# Patient Record
Sex: MALE | URBAN - METROPOLITAN AREA
[De-identification: names, ages, dates, MRNs, and addresses within clinical notes are randomized per-mention and may not be internally consistent; named-entity substitution may affect disease eponyms.]

---

## 2018-09-26 ENCOUNTER — OFFICE VISIT (OUTPATIENT)
Dept: URBAN - METROPOLITAN AREA CLINIC 23 | Facility: CLINIC | Age: 83
End: 2018-09-26
Payer: COMMERCIAL

## 2018-09-26 DIAGNOSIS — H53.2 DIPLOPIA: ICD-10-CM

## 2018-09-26 DIAGNOSIS — E11.9 TYPE 2 DIABETES MELLITUS W/O COMPLICATION: Primary | ICD-10-CM

## 2018-09-26 PROCEDURE — 92012 INTRM OPH EXAM EST PATIENT: CPT | Performed by: OPTOMETRIST

## 2018-09-26 ASSESSMENT — INTRAOCULAR PRESSURE
OS: 15
OD: 14

## 2018-09-26 ASSESSMENT — KERATOMETRY
OD: 45.00
OS: 45.63

## 2018-09-26 NOTE — IMPRESSION/PLAN
Impression: Type 2 diabetes mellitus w/o complication: D82.6. Plan: Diabetes type II: no background retinopathy, no signs of neovascularization noted. Discussed ocular and systemic benefits of blood sugar control.